# Patient Record
Sex: MALE | URBAN - METROPOLITAN AREA
[De-identification: names, ages, dates, MRNs, and addresses within clinical notes are randomized per-mention and may not be internally consistent; named-entity substitution may affect disease eponyms.]

---

## 2021-04-29 ENCOUNTER — OFFICE VISIT (OUTPATIENT)
Dept: URBAN - METROPOLITAN AREA CLINIC 58 | Facility: CLINIC | Age: 71
End: 2021-04-29
Payer: MEDICARE

## 2021-04-29 DIAGNOSIS — H26.491 OTHER SECONDARY CATARACT, RIGHT EYE: ICD-10-CM

## 2021-04-29 PROCEDURE — 99203 OFFICE O/P NEW LOW 30 MIN: CPT | Performed by: OPTOMETRIST

## 2021-04-29 ASSESSMENT — INTRAOCULAR PRESSURE
OD: 12
OS: 12

## 2022-03-23 ENCOUNTER — OFFICE VISIT (OUTPATIENT)
Dept: URBAN - METROPOLITAN AREA CLINIC 58 | Facility: CLINIC | Age: 72
End: 2022-03-23
Payer: MEDICARE

## 2022-03-23 DIAGNOSIS — H35.373 PUCKERING OF MACULA, BILATERAL: Primary | ICD-10-CM

## 2022-03-23 DIAGNOSIS — H52.4 PRESBYOPIA: ICD-10-CM

## 2022-03-23 DIAGNOSIS — H43.812 VITREOUS DETACHMENT OF LEFT EYE: ICD-10-CM

## 2022-03-23 PROCEDURE — 92014 COMPRE OPH EXAM EST PT 1/>: CPT | Performed by: OPTOMETRIST

## 2022-03-23 ASSESSMENT — VISUAL ACUITY
OS: 20/20
OD: 20/20

## 2022-03-23 ASSESSMENT — INTRAOCULAR PRESSURE
OD: 17
OS: 16

## 2022-03-23 NOTE — IMPRESSION/PLAN
Impression: Puckering of macula, bilateral: H35.373. Plan: Discussed diagnosis in detail with patient. Vision is good with glasses, don't recommend ERM removal unless patient wants to do ERM and floater removed at the same time. Will continue to observe condition and or symptoms. Call if [address] worsens.

## 2022-10-04 ENCOUNTER — OFFICE VISIT (OUTPATIENT)
Dept: URBAN - METROPOLITAN AREA CLINIC 58 | Facility: CLINIC | Age: 72
End: 2022-10-04
Payer: MEDICARE

## 2022-10-04 DIAGNOSIS — H16.223 BILATERAL KERATOCONJUNCTIVITIS SICCA, NOT SPECIFIED AS SJOGREN'S: ICD-10-CM

## 2022-10-04 DIAGNOSIS — H35.373 PUCKERING OF MACULA, BILATERAL: ICD-10-CM

## 2022-10-04 DIAGNOSIS — H43.812 VITREOUS DETACHMENT OF LEFT EYE: Primary | ICD-10-CM

## 2022-10-04 PROCEDURE — 99213 OFFICE O/P EST LOW 20 MIN: CPT | Performed by: OPTOMETRIST

## 2022-10-04 RX ORDER — CYCLOSPORINE 0.5 MG/ML
0.05 % EMULSION OPHTHALMIC
Qty: 180 | Refills: 3 | Status: ACTIVE
Start: 2022-10-04

## 2022-10-04 ASSESSMENT — VISUAL ACUITY
OS: 20/30
OD: 20/20

## 2022-10-04 NOTE — IMPRESSION/PLAN
Impression: Vitreous detachment of left eye: H43.712. Plan: Diagnosis discussed with patient in detail. Consider Vitrectomy.  Patient to discussed with Dr. Ange Grant in x.

## 2022-10-04 NOTE — IMPRESSION/PLAN
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 Plan: Dry eyes account for the patient's complaints. There is no evidence of permanent changes to the cornea. Explained condition does not have a cure and will need artificial tears for maintenance. Pt has tried different varieties of AT's without much help. Pt has inflammation of corneas. Patient continues to improve with Restasis. Continue restasis 1gtt OU bid.

## 2023-01-23 NOTE — IMPRESSION/PLAN
Impression: Other secondary cataract, right eye: H26.491. Plan: Discussed diagnosis in detail with patient. No treatment is required at this time. Will continue to observe condition and or symptoms. Call if [address] worsens. · Start nicotine patch 21mg daily

## 2023-03-23 ENCOUNTER — OFFICE VISIT (OUTPATIENT)
Dept: URBAN - METROPOLITAN AREA CLINIC 58 | Facility: CLINIC | Age: 73
End: 2023-03-23
Payer: MEDICARE

## 2023-03-23 DIAGNOSIS — H35.373 PUCKERING OF MACULA, BILATERAL: ICD-10-CM

## 2023-03-23 DIAGNOSIS — H26.493 OTHER SECONDARY CATARACT, BILATERAL: ICD-10-CM

## 2023-03-23 DIAGNOSIS — H43.813 BILATERAL VITREOUS DETACHMENT OF EYES: Primary | ICD-10-CM

## 2023-03-23 DIAGNOSIS — H53.149 PHOTOPHOBIA: ICD-10-CM

## 2023-03-23 PROCEDURE — 99214 OFFICE O/P EST MOD 30 MIN: CPT | Performed by: OPTOMETRIST

## 2023-03-23 RX ORDER — PILOCARPINE HYDROCHLORIDE 10 MG/ML
1 % SOLUTION/ DROPS OPHTHALMIC
Qty: 5 | Refills: 0 | Status: ACTIVE
Start: 2023-03-23

## 2023-03-23 ASSESSMENT — KERATOMETRY
OD: 42.38
OS: 42.25

## 2023-03-23 ASSESSMENT — INTRAOCULAR PRESSURE
OS: 16
OD: 17

## 2023-03-23 NOTE — IMPRESSION/PLAN
Impression: Bilateral vitreous detachment of eyes: H43.813. Plan: Discussed diagnosis with Pt. There is no evidence of retinal breaks. Discussed S/S of RD. Pt to RTC if they occur. Discussed the option of seeing a retina specialist for a Vitrectomy evaluation.

## 2023-03-23 NOTE — IMPRESSION/PLAN
Impression: Puckering of macula, bilateral: H35.373. Plan: Discussed diagnosis in detail with patient.  Will refer patient to Dr. Araceli Russ for further eval.

## 2023-03-23 NOTE — IMPRESSION/PLAN
Impression: Photophobia: H53.149. Plan: Discussed diagnosis in detail with patient. Start Pilocarpine OU QAM. Discussed side effects of drop. Will continue to monitor.

## 2023-03-23 NOTE — IMPRESSION/PLAN
Impression: Other secondary cataract, bilateral: H26.493. Plan: Discussed diagnosis in detail with patient. No treatment is required at this time. Will continue to observe condition and or symptoms. Call if [address] worsens.   Consider YAG PC if vision problems persist

## 2023-04-06 ENCOUNTER — OFFICE VISIT (OUTPATIENT)
Dept: URBAN - METROPOLITAN AREA CLINIC 58 | Facility: CLINIC | Age: 73
End: 2023-04-06
Payer: MEDICARE

## 2023-04-06 DIAGNOSIS — H53.149 PHOTOPHOBIA: Primary | ICD-10-CM

## 2023-04-06 PROCEDURE — 99212 OFFICE O/P EST SF 10 MIN: CPT | Performed by: OPTOMETRIST

## 2023-04-06 ASSESSMENT — INTRAOCULAR PRESSURE
OS: 12
OD: 12

## 2023-04-06 NOTE — IMPRESSION/PLAN
Impression: Photophobia: H53.149. Plan: Discussed diagnosis in detail with patient. Improved with Pilocarpine, continue OU QAM PRN. Will continue to monitor. Call if any concerns.

## 2023-10-06 ENCOUNTER — OFFICE VISIT (OUTPATIENT)
Dept: URBAN - METROPOLITAN AREA CLINIC 58 | Facility: CLINIC | Age: 73
End: 2023-10-06
Payer: MEDICARE

## 2023-10-06 DIAGNOSIS — H53.149 PHOTOPHOBIA: Primary | ICD-10-CM

## 2023-10-06 PROCEDURE — 99213 OFFICE O/P EST LOW 20 MIN: CPT | Performed by: OPTOMETRIST

## 2023-10-06 ASSESSMENT — KERATOMETRY
OD: 42.38
OS: 42.25

## 2023-10-06 ASSESSMENT — INTRAOCULAR PRESSURE
OS: 13
OD: 12

## 2025-01-24 NOTE — IMPRESSION/PLAN
Impression: Puckering of macula, bilateral: H35.373. Plan: Discussed diagnosis in detail with patient. Vision is borderline with glasses. Discussed the option of having ERM removed with Vitrectomy at the same time.  Patient has an appointment with Retina Specialist in [facility]. Humira Counseling:  I discussed with the patient the risks of adalimumab including but not limited to myelosuppression, immunosuppression, autoimmune hepatitis, demyelinating diseases, lymphoma, and serious infections.  The patient understands that monitoring is required including a PPD at baseline and must alert us or the primary physician if symptoms of infection or other concerning signs are noted.

## 2025-04-07 ENCOUNTER — OFFICE VISIT (OUTPATIENT)
Dept: URBAN - METROPOLITAN AREA CLINIC 58 | Facility: CLINIC | Age: 75
End: 2025-04-07
Payer: MEDICARE

## 2025-04-07 DIAGNOSIS — H26.493 OTHER SECONDARY CATARACT, BILATERAL: Primary | ICD-10-CM

## 2025-04-07 DIAGNOSIS — H16.223 BILATERAL KERATOCONJUNCTIVITIS SICCA, NOT SPECIFIED AS SJOGREN'S: ICD-10-CM

## 2025-04-07 DIAGNOSIS — H35.373 PUCKERING OF MACULA, BILATERAL: ICD-10-CM

## 2025-04-07 DIAGNOSIS — H52.4 PRESBYOPIA: ICD-10-CM

## 2025-04-07 PROCEDURE — 92014 COMPRE OPH EXAM EST PT 1/>: CPT | Performed by: OPTOMETRIST

## 2025-04-07 RX ORDER — CYCLOSPORINE 0.5 MG/ML
0.05 % EMULSION OPHTHALMIC
Qty: 180 | Refills: 3 | Status: ACTIVE
Start: 2025-04-07

## 2025-04-07 ASSESSMENT — INTRAOCULAR PRESSURE
OS: 19
OD: 15

## 2025-04-07 ASSESSMENT — VISUAL ACUITY
OD: 20/20
OS: 20/30

## 2025-05-30 NOTE — IMPRESSION/PLAN
Impression: Vitreous detachment of left eye: H43.322. Plan: Consider Vitrectomy.  Patient to discussed with Dr. Ajith Pena in [place]. Spontaneous, unlabored and symmetrical